# Patient Record
Sex: MALE | Race: WHITE
[De-identification: names, ages, dates, MRNs, and addresses within clinical notes are randomized per-mention and may not be internally consistent; named-entity substitution may affect disease eponyms.]

---

## 2021-12-25 ENCOUNTER — HOSPITAL ENCOUNTER (EMERGENCY)
Dept: HOSPITAL 43 - DL.ED | Age: 66
Discharge: HOME | End: 2021-12-25
Payer: MEDICARE

## 2021-12-25 DIAGNOSIS — S60.452A: Primary | ICD-10-CM
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
## 2021-12-25 NOTE — EDM.PDOC
ED HPI GENERAL MEDICAL PROBLEM





- General


Chief Complaint: Laceration


Stated Complaint: FISHING HOOK STUCK IN FINGER


Time Seen by Provider: 12/25/21 11:58


Source of Information: Reports: Patient, RN, RN Notes Reviewed


History Limitations: Reports: No Limitations





- History of Present Illness


INITIAL COMMENTS - FREE TEXT/NARRATIVE: 


Pt presents to ER with c/o a fish hook stuck in his right middle finger. Denies 

any other injury. Pt's tetanus vaccine is up to date as of 5-6yrs ago.





Onset: Sudden


Onset Date: 12/25/21


Duration: Constant


Location: Reports: Upper Extremity, Right


Quality: Reports: Ache


Severity: Mild


Improves with: Reports: None


Worsens with: Reports: None


Associated Symptoms: Reports: No Other Symptoms





- Related Data


                                    Allergies











Allergy/AdvReac Type Severity Reaction Status Date / Time


 


No Known Allergies Allergy   Verified 12/25/21 12:02











Home Meds: 


                                    Home Meds





. [Unable to Verify Home Med List]  12/25/21 [History]











Review of Systems





- Review of Systems


Review Of Systems: Comprehensive ROS is negative, except as noted in HPI.





ED EXAM, GENERAL





- Physical Exam


Exam: See Below


Exam Limited By: No Limitations


General Appearance: Alert, WD/WN, No Apparent Distress


Respiratory/Chest: No Respiratory Distress


Cardiovascular: Normal Peripheral Pulses


Extremities: Normal Range of Motion, Non-Tender, Normal Capillary Refill, Other 

(Fish hook stuck in distal right 3rd finger, no redness or swelling.)


Psychiatric: Normal Mood


Skin Exam: Warm, Dry





ED TRAUMA EXTREMITY PROCEDURES





- Additional/Other Procedure(s)


Other (Free Text) Procedure(s): 


Distal right middle finger blocked with local infiltration of lidocaine 1% 2cc. 

Fish hook grasped at the shank and removed through the entrance wound without 

complication. No blood loss.








Course





- Vital Signs


Last Recorded V/S: 


                                Last Vital Signs











Temp  96.7 F L  12/25/21 12:02


 


Pulse  85   12/25/21 12:02


 


Resp  20   12/25/21 12:02


 


BP  160/82 H  12/25/21 12:02


 


Pulse Ox  95   12/25/21 12:02














- Orders/Labs/Meds


Meds: 


Medications














Discontinued Medications














Generic Name Dose Route Start Last Admin





  Trade Name Freq  PRN Reason Stop Dose Admin


 


Bacitracin  1 dose  12/25/21 11:58 





  Bacitracin Oint 1 Gm U/D Packet  TOP  12/25/21 11:59 





  ONETIME ONE  


 


Lidocaine HCl  30 ml  12/25/21 11:58 





  Lidocaine 1% 30 Ml Sdv  INJECT  12/25/21 11:59 





  ONETIME ONE  














Departure





- Departure


Time of Disposition: 12:13


Disposition: Home, Self-Care 01


Condition: Good


Clinical Impression: 


Fish hook injury of right middle finger


Qualifiers:


 Encounter type: initial encounter Qualified Code(s): S69.91XA - Unspecified 

injury of right wrist, hand and finger(s), initial encounter








- Discharge Information


*PRESCRIPTION DRUG MONITORING PROGRAM REVIEWED*: Not Applicable


*COPY OF PRESCRIPTION DRUG MONITORING REPORT IN PATIENT FLOR: Not Applicable


Instructions:  Puncture Wound, Easy-to-Read


Forms:  ED Department Discharge


Additional Instructions: 


Follow up in clinic if any signs of wound infection develop.





Sepsis Event Note (ED)





- Focused Exam


Vital Signs: 


                                   Vital Signs











  Temp Pulse Resp BP Pulse Ox


 


 12/25/21 12:02  96.7 F L  85  20  160/82 H  95

## 2022-04-03 ENCOUNTER — HOSPITAL ENCOUNTER (EMERGENCY)
Dept: HOSPITAL 43 - DL.ED | Age: 67
LOS: 1 days | Discharge: HOME | End: 2022-04-04
Payer: MEDICARE

## 2022-04-03 DIAGNOSIS — Z79.01: ICD-10-CM

## 2022-04-03 DIAGNOSIS — I48.91: ICD-10-CM

## 2022-04-03 DIAGNOSIS — I10: ICD-10-CM

## 2022-04-03 DIAGNOSIS — D68.9: ICD-10-CM

## 2022-04-03 DIAGNOSIS — Z79.899: ICD-10-CM

## 2022-04-03 DIAGNOSIS — R04.2: Primary | ICD-10-CM

## 2022-04-03 DIAGNOSIS — E78.00: ICD-10-CM

## 2022-04-04 LAB
ANION GAP SERPL CALC-SCNC: 13.9 MEQ/L (ref 7–13)
APTT PPP: 34.5 SEC (ref 22–34)
CHLORIDE SERPL-SCNC: 109 MMOL/L (ref 98–107)
SODIUM SERPL-SCNC: 146 MMOL/L (ref 136–145)

## 2022-06-02 ENCOUNTER — HOSPITAL ENCOUNTER (EMERGENCY)
Dept: HOSPITAL 43 - DL.ED | Age: 67
Discharge: NURSING, REHAB OR LONG-TERM CARE | End: 2022-06-02
Payer: MEDICARE

## 2022-06-02 DIAGNOSIS — E78.00: ICD-10-CM

## 2022-06-02 DIAGNOSIS — I48.20: ICD-10-CM

## 2022-06-02 DIAGNOSIS — R09.02: ICD-10-CM

## 2022-06-02 DIAGNOSIS — I10: ICD-10-CM

## 2022-06-02 DIAGNOSIS — D68.9: ICD-10-CM

## 2022-06-02 DIAGNOSIS — R04.2: Primary | ICD-10-CM

## 2022-06-02 DIAGNOSIS — Z79.899: ICD-10-CM

## 2022-06-02 DIAGNOSIS — Z20.822: ICD-10-CM

## 2022-06-02 DIAGNOSIS — Z79.01: ICD-10-CM

## 2022-06-02 LAB
ANION GAP SERPL CALC-SCNC: 11.2 MEQ/L (ref 7–13)
CHLORIDE SERPL-SCNC: 104 MMOL/L (ref 98–107)
SODIUM SERPL-SCNC: 142 MMOL/L (ref 136–145)

## 2022-06-02 PROCEDURE — 93005 ELECTROCARDIOGRAM TRACING: CPT

## 2022-06-02 PROCEDURE — U0002 COVID-19 LAB TEST NON-CDC: HCPCS

## 2022-06-02 PROCEDURE — 85379 FIBRIN DEGRADATION QUANT: CPT

## 2022-06-02 PROCEDURE — 83605 ASSAY OF LACTIC ACID: CPT

## 2022-06-02 PROCEDURE — 36415 COLL VENOUS BLD VENIPUNCTURE: CPT

## 2022-06-02 PROCEDURE — 71260 CT THORAX DX C+: CPT

## 2022-06-02 PROCEDURE — 99285 EMERGENCY DEPT VISIT HI MDM: CPT

## 2022-06-02 PROCEDURE — 85610 PROTHROMBIN TIME: CPT

## 2022-06-02 PROCEDURE — 87040 BLOOD CULTURE FOR BACTERIA: CPT

## 2022-06-02 PROCEDURE — 71045 X-RAY EXAM CHEST 1 VIEW: CPT

## 2022-06-02 PROCEDURE — 85025 COMPLETE CBC W/AUTO DIFF WBC: CPT

## 2022-06-02 PROCEDURE — 83880 ASSAY OF NATRIURETIC PEPTIDE: CPT

## 2022-06-02 PROCEDURE — 83735 ASSAY OF MAGNESIUM: CPT

## 2022-06-02 PROCEDURE — 80053 COMPREHEN METABOLIC PANEL: CPT
